# Patient Record
Sex: MALE | Race: WHITE | NOT HISPANIC OR LATINO | Employment: STUDENT | URBAN - METROPOLITAN AREA
[De-identification: names, ages, dates, MRNs, and addresses within clinical notes are randomized per-mention and may not be internally consistent; named-entity substitution may affect disease eponyms.]

---
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
## 2020-02-19 ENCOUNTER — OFFICE VISIT (OUTPATIENT)
Dept: FAMILY MEDICINE CLINIC | Facility: CLINIC | Age: 20
End: 2020-02-19

## 2020-02-19 VITALS
BODY MASS INDEX: 23.43 KG/M2 | HEIGHT: 72 IN | OXYGEN SATURATION: 98 % | SYSTOLIC BLOOD PRESSURE: 122 MMHG | TEMPERATURE: 97.3 F | RESPIRATION RATE: 17 BRPM | DIASTOLIC BLOOD PRESSURE: 74 MMHG | HEART RATE: 66 BPM | WEIGHT: 173 LBS

## 2020-02-19 DIAGNOSIS — S06.0X1D CONCUSSION WITH LOSS OF CONSCIOUSNESS OF 30 MINUTES OR LESS, SUBSEQUENT ENCOUNTER: Primary | ICD-10-CM

## 2020-02-19 PROCEDURE — 99213 OFFICE O/P EST LOW 20 MIN: CPT | Performed by: PHYSICIAN ASSISTANT

## 2020-02-19 PROCEDURE — 1036F TOBACCO NON-USER: CPT | Performed by: PHYSICIAN ASSISTANT

## 2020-02-19 PROCEDURE — 3008F BODY MASS INDEX DOCD: CPT | Performed by: PHYSICIAN ASSISTANT

## 2020-02-19 NOTE — PROGRESS NOTES
Taisha Landrum 21 y o  male   Date:  2/19/2020      Assessment and Plan:    Kat Brownlee was seen today for follow-up and referral     Diagnoses and all orders for this visit:    Concussion with loss of consciousness of 30 minutes or less, subsequent encounter  -    Patient was referred to sports medicine from [facility] for clearance to return to soccer, he already has an appt in 2 days but needs referral  -     Ambulatory referral to Sports Medicine; Future  - uncomplicated, thus far no residual symptoms or neurologic deficits/issues             HPI:  Chief Complaint   Patient presents with    Follow-up     Declines flu vaccine     Referral     Needs referral to specialist for concussion  Concussion was two weeks ago - seen by [place] sports doctor      HPI   Patient is a 20 yo healthy male who presents for referral to see sports medicine  He plays soccer at [employer]  He was playing an off season game to weeks ago and his head collided with another player and he sustained a concussion  His L forehead collided with him  He reports blacking out for a few seconds until he came back to  He has been followed daily by atheletic trainers and had to go see [place] doctor whom noticed nystagmus and he now requires clearance from sports medicine  He already was given a name and has an appt with [address] Sandy Moe in [place], however he needed a referral from our office  He has had headaches at first, last was around 4-5 days ago  He was having sensitivity to light and sound but this has approved  No nausea, vomiting  He is concentrating fine, no balance problems  He is attending class as normal  No balance problems  ROS: Review of Systems   Constitutional: Negative  Eyes: Negative  Respiratory: Negative  Cardiovascular: Negative  Gastrointestinal: Negative  Genitourinary: Negative  Musculoskeletal: Negative  Skin: Negative  Neurological: Positive for headaches (resolved)  Negative for dizziness, seizures, syncope and weakness  Psychiatric/Behavioral: Negative          Past Medical History:   Diagnosis Date    Known health problems: none      Patient Active Problem List   Diagnosis    Chronic seasonal allergic rhinitis    Recurrent sinusitis    Left wrist pain    Acne vulgaris       Past Surgical History:   Procedure Laterality Date    MYRINGOTOMY W/ TUBES      MYRINGOTOMY TUBES IN EARS AS PER [facility]    NO PAST SURGERIES         Social History     Socioeconomic History    Marital status: Single     Spouse name: None    Number of children: None    Years of education: None    Highest education level: None   Occupational History    None   Social Needs    Financial resource strain: None    Food insecurity:     Worry: None     Inability: None    Transportation needs:     Medical: No     Non-medical: No   Tobacco Use    Smoking status: Never Smoker    Smokeless tobacco: Never Used   Substance and Sexual Activity    Alcohol use: No    Drug use: No    Sexual activity: Yes     Partners: Female     Birth control/protection: Condom Male   Lifestyle    Physical activity:     Days per week: 3 days     Minutes per session: None    Stress: None   Relationships    Social connections:     Talks on phone: None     Gets together: None     Attends Restoration service: None     Active member of club or organization: None     Attends meetings of clubs or organizations: None     Relationship status: None    Intimate partner violence:     Fear of current or ex partner: None     Emotionally abused: None     Physically abused: None     Forced sexual activity: None   Other Topics Concern    None   Social History Narrative    Will be going to [employer] for [employer]     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        AS OF 3/27/13 IN [facility]:     PROVIDER: 0    DAYS/WEEK: 0       Family History   Problem Relation Age of Onset    Cancer Maternal Grandfather     No Known Problems Mother     No Known Problems Father        No Known Allergies    No current outpatient medications on file  Physical Exam:  /74 (BP Location: Left arm, Patient Position: Sitting)   Pulse 66   Temp (!) 97 3 °F (36 3 °C) (Tympanic)   Resp 17   Ht 6' (1 829 m)   Wt 78 5 kg (173 lb)   SpO2 98%   BMI 23 46 kg/m²     Physical Exam   Constitutional: He is oriented to person, place, and time  Vital signs are normal  He appears well-developed and well-nourished  No distress  HENT:   Head: Normocephalic and atraumatic  Right Ear: Tympanic membrane, external ear and ear canal normal    Left Ear: Tympanic membrane, external ear and ear canal normal    Nose: Nose normal    Mouth/Throat: Oropharynx is clear and moist    Eyes: Pupils are equal, round, and reactive to light  Conjunctivae, EOM and lids are normal    Neck: Trachea normal and normal range of motion  Neck supple  Cardiovascular: Normal rate, regular rhythm, S1 normal, S2 normal and normal heart sounds  No murmur heard  Pulmonary/Chest: Effort normal and breath sounds normal  No respiratory distress  He has no wheezes  He has no rhonchi  He has no rales  Abdominal: Normal appearance  Musculoskeletal: Normal range of motion  He exhibits no edema or deformity  Neurological: He is alert and oriented to person, place, and time  He has normal strength and normal reflexes  No cranial nerve deficit or sensory deficit  GCS eye subscore is 4  GCS verbal subscore is 5  GCS motor subscore is 6  Skin: Skin is warm and dry  No rash noted  No cyanosis  No pallor  Nails show no clubbing  Psychiatric: He has a normal mood and affect   His behavior is normal  Cognition and memory are normal

## 2020-02-21 VITALS
HEART RATE: 50 BPM | BODY MASS INDEX: 23.94 KG/M2 | SYSTOLIC BLOOD PRESSURE: 119 MMHG | DIASTOLIC BLOOD PRESSURE: 75 MMHG | WEIGHT: 171 LBS | HEIGHT: 71 IN

## 2020-02-21 DIAGNOSIS — S06.0X1A CONCUSSION WITH LOSS OF CONSCIOUSNESS OF 30 MINUTES OR LESS, INITIAL ENCOUNTER: ICD-10-CM

## 2020-02-21 PROCEDURE — 1036F TOBACCO NON-USER: CPT | Performed by: ORTHOPAEDIC SURGERY

## 2020-02-21 PROCEDURE — 99203 OFFICE O/P NEW LOW 30 MIN: CPT | Performed by: ORTHOPAEDIC SURGERY

## 2020-02-21 PROCEDURE — 3008F BODY MASS INDEX DOCD: CPT | Performed by: ORTHOPAEDIC SURGERY

## 2020-02-21 NOTE — PROGRESS NOTES
Assessment:       1  Concussion with loss of consciousness of 30 minutes or less, initial encounter          Plan:        I explained my current clinical findings to DEANDRE  Based on his history he likely sustained a concussion which has now clinically resolved  At this time I will suggest him to start gradual return to play protocol as long as there is no symptom recurrence  He may also return back to all academic activities  Follow-up as needed  Subjective:     Patient ID: Lizzeth Chun is a 21 y o  male  Chief Complaint:    HPI  DEANDRE is a 49-year-old [facility] who is here today for evaluation of a soccer related concussion injury sustained on 2/6/2020 when he left his left forehead with another player's head while playing soccer  Says that he blacked out for a few seconds  Denies seizures, nausea or vomiting  Initial symptoms included feeling dazed and stunned as well as some left-sided frontal headache  All his symptoms subsided about 5 days ago and he currently does not report any symptoms despite doing light aerobic activity or his academic activities  He does not have any known previous history of concussions, learning disability, mood disorders and he is currently not taking any medications  Social History     Occupational History    Not on file   Tobacco Use    Smoking status: Never Smoker    Smokeless tobacco: Never Used   Substance and Sexual Activity    Alcohol use: No    Drug use: No    Sexual activity: Yes     Partners: Female     Birth control/protection: Condom Male      Review of Systems   Constitutional: Negative  HENT: Negative  Eyes: Negative  Respiratory: Negative  Cardiovascular: Negative  Gastrointestinal: Negative  Endocrine: Negative  Genitourinary: Negative  Skin: Negative  Allergic/Immunologic: Negative  Neurological: Negative  Hematological: Negative  Psychiatric/Behavioral: Negative  Objective:     Ortho ExamPhysical Exam   Constitutional: He is oriented to person, place, and time  He appears well-developed and well-nourished  HENT:   Head: Normocephalic and atraumatic  Eyes: Conjunctivae are normal    Cardiovascular: Normal rate and regular rhythm  Pulmonary/Chest: Effort normal  No respiratory distress  Neurological: He is alert and oriented to person, place, and time  Skin: Skin is warm  No erythema  Psychiatric: He has a normal mood and affect  His behavior is normal  Judgment and thought content normal    Nursing note and vitals reviewed  Physical Exam     /75 (BP Location: Left arm, Patient Position: Sitting, Cuff Size: Standard)   Pulse (!) 50   Ht 5' 11" (1 803 m)   Wt 77 6 kg (171 lb)   BMI 23 85 kg/m²   General:   NAD:  Yes  Psych:   AAOX3:  Yes   Mood and Affect:  Normal  HEENT:   Lacerations:  No   Bruising:  No   PEERLA:  Yes   EOMI:  Yes   C/D/I:  Yes   Fracture/Trauma:  No   Fundi Discs Sharp:  N/A  Neuro:   Examination of Coordination:  Normal   CNII - XII Intact:  Yes   FTN:  Normal   Accommodation:  6cm   Convergence:  5cm  Vestibular Ocular:  Gaze stability:  No dizziness or headache with horizontal or vertical saccades    Negative VOR, Smooth ocular pursuit  Mild slow nystagmus on bilateral far laetral gaze

## 2020-02-21 NOTE — LETTER
Academic / Physical School Note &/or Note to Certified Athletic Trainer    February 21, 2020    Patient: Pam Dean  YOB: 2000  Age:  21 y o  Date of visit: 2/21/2020    The above patient was seen in our office recently  Due to a concussion we recommend:    May return back to all academic activities including academic testing  The following instructions that are checked apply for this patient:   No physical activity    Light aerobic, non-contact activity (with no symptoms)    May progress through RTP up to step 4  Please see table below  x Graded concussion Return to Play protocol  Please see table below  1)  No physical activity    2)  Light aerobic activity (walking, swimming, stationary bike)    3)  Sport-specific activity (non-contact)    4)  Non-contact training drill and resistance training    5)  Full contact practice    6)  Normal game     ** If symptoms occur at any level, drop back to prior level  **    Please perform IMPACT test on:  n/a    Patient to return to our office:  As needed    Patient fully understands and verbally agrees with the above mentioned instructions      Please contact our office with any questions at:  [phone]     Sincerely,    MD Pam Johnson

## 2020-04-08 ENCOUNTER — TELEPHONE (OUTPATIENT)
Dept: FAMILY MEDICINE CLINIC | Facility: CLINIC | Age: 20
End: 2020-04-08

## 2021-01-08 ENCOUNTER — NURSE TRIAGE (OUTPATIENT)
Dept: OTHER | Facility: OTHER | Age: 21
End: 2021-01-08

## 2021-01-08 NOTE — TELEPHONE ENCOUNTER
Regarding: COVID test request-asymptomatic, exposure  ----- Message from Anastasiya Fitch sent at 1/8/2021  2:07 PM EST -----  "I was with someone on [place] that just found out they were positive for COVID  I do not have symptoms  "

## 2021-01-08 NOTE — TELEPHONE ENCOUNTER
Reason for Disposition   No answer  First attempt to contact caller  Follow-up call scheduled within 15 minutes  Protocols used: NO CONTACT OR DUPLICATE CONTACT CALL-ADULT-[place]    Called ph# provided but no answer and message states that no voice mailbox has been set up

## 2021-03-05 ENCOUNTER — APPOINTMENT (OUTPATIENT)
Dept: RADIOLOGY | Facility: OTHER | Age: 21
End: 2021-03-05
Payer: COMMERCIAL

## 2021-03-05 ENCOUNTER — OFFICE VISIT (OUTPATIENT)
Dept: OBGYN CLINIC | Facility: OTHER | Age: 21
End: 2021-03-05
Payer: COMMERCIAL

## 2021-03-05 VITALS
WEIGHT: 167 LBS | BODY MASS INDEX: 22.62 KG/M2 | DIASTOLIC BLOOD PRESSURE: 62 MMHG | SYSTOLIC BLOOD PRESSURE: 113 MMHG | HEIGHT: 72 IN | HEART RATE: 76 BPM

## 2021-03-05 DIAGNOSIS — S99.921A INJURY OF RIGHT FOOT, INITIAL ENCOUNTER: ICD-10-CM

## 2021-03-05 DIAGNOSIS — S93.491A SPRAIN OF ANTERIOR TALOFIBULAR LIGAMENT OF RIGHT ANKLE, INITIAL ENCOUNTER: Primary | ICD-10-CM

## 2021-03-05 DIAGNOSIS — M79.671 PAIN IN RIGHT FOOT: ICD-10-CM

## 2021-03-05 PROCEDURE — 99204 OFFICE O/P NEW MOD 45 MIN: CPT | Performed by: FAMILY MEDICINE

## 2021-03-05 PROCEDURE — 73630 X-RAY EXAM OF FOOT: CPT

## 2021-03-05 PROCEDURE — 73610 X-RAY EXAM OF ANKLE: CPT

## 2021-03-05 RX ORDER — NAPROXEN 500 MG/1
500 TABLET ORAL 2 TIMES DAILY PRN
Qty: 40 TABLET | Refills: 0 | Status: SHIPPED | OUTPATIENT
Start: 2021-03-05

## 2021-03-05 NOTE — PATIENT INSTRUCTIONS
Explained the patient that his examination today is consistent with lateral ankle sprain without evidence of fracture on his x-rays  Recommended nonweightbearing with crutches and controlled ankle motion boot  He may begin to walking controlled ankle motion boot as tolerated  He is to follow-up with physician in athletic training room at his college Tuesday next week  Ankle sprains are tears of ligaments in your ankle  Ligaments are fibrous tissues that hold bones together like stitches  Some ligaments such as the ACL in the knee do not heal on their own; however, the ligaments involved in ankle sprain usually do heal without issue over 4-6 weeks  Some people even have relief for more minor sprains within 1-2 weeks  Initial treatment includes PRICE treatment including Protection with ankle splint, Rest with range of motion exercises to prevent stiffness, Ice for 20 minutes every 2-3 hours for the first 2 days or until swelling plateaus, and Elevation to keep the foot and ankle 6-10 inches above your heart when lying down to allow for drainage of fluid from your ankle  Prolonged rest within a few days including immobilization of your ankle in braces, crutches, or Cam boot can result in severe stiffness and worsening of symptoms  As such, please start daily range-of-motion exercises moving your ankle in circles and drawing the alphabet using year big toe as if it were a pencil in the air (GALINA Koehler 2001)  Physical therapy is also key to helping speed up the healing process as well as for preventing future sprain  Once sprained you are at risk for sprain again for up to 1 year after injury  Physical therapy including home exercises for 8-12 weeks has been shown to be preventative of future sprains (B&K 4th)  You may attempt return to running when walking is no longer painful   I recommended gradual return to running to include a few days of 50% walking/50% jogging 1 mile, followed by 50% jogging/50% running 1 mile, followed by 100% running 1 mile if pain free  I recommend increasing mileage at a slow pace thereafter (GALINA Jay 2001)  I also recommend wearing lace-up ankle brace when playing sports for up to 1 year to prevent the rate of recurrence but not severity of recurrent ankle sprains  (Arch Orthop Trauma Surg  2013 Aug; 133 (5): 8038-0559)  If you have persistent symptoms at 6 weeks then we will consider an MRI of your ankle to evaluate for other injuries that can be hidden such as an osteochondral fracture of the talus bone (B&K4th)  Educated risks of mixing NSAIDS ( (non-steroidal anti-inflammatory pills including advil, ibuprofen, motrin, meloxicam, celecoxib, aleve, naproxen, and aspirin containing products) with each other or with steroids (such as prednisone, medrol)  Explained risks of mixing these medications including stomach ulcer, severe internal bleeding, and kidney failure  Instructed not to take NSAIDS if have history of stomach ulcers, kidney issues, or uncontrolled hypertension  Instructed patient to use only one brand as prescribed  For naproxen, a maximum of 500 mg per dose every 12 hours and no more than two doses or 1,000mg per day  For Ibuprofen, a maximum of 800 mg per dose every 6 hours but no more than 3 doses or 2,400 mg per day  Never take these medications together  Never take these medications the same day  For severe pain and only if you have no liver problems, you may add Tylenol (also known as acetaminophen) maximum of 1,000  Mg per dose every 6 hours but no more 3 doses or 3,000 mg per day  Patient expressed understanding and agreed to plan

## 2021-03-05 NOTE — PROGRESS NOTES
1  Sprain of anterior talofibular ligament of right ankle, initial encounter     2  Pain in right foot  XR foot 3+ vw right    XR ankle 3+ vw right   3  Injury of right foot, initial encounter  XR foot 3+ vw right    naproxen (NAPROSYN) 500 mg tablet    XR ankle 3+ vw right     Orders Placed This Encounter   Procedures    XR foot 3+ vw right    XR ankle 3+ vw right        Imaging Studies (I personally reviewed images in PACS and report):    X-ray right foot 03/05/2021: No acute osseous abnormality   X-ray right ankle 03/05/2021: No acute osseous abnormality    IMPRESSION:   right ankle sprain   Right foot contusion      Repeat X-ray next visit:    Right ankle, right foot      Return in about 2 weeks (around 3/19/2021)  Patient Instructions   Explained the patient that his examination today is consistent with lateral ankle sprain without evidence of fracture on his x-rays  Recommended nonweightbearing with crutches and controlled ankle motion boot  He may begin to walking controlled ankle motion boot as tolerated  He is to follow-up with physician in athletic training room at his college Tuesday next week  Ankle sprains are tears of ligaments in your ankle  Ligaments are fibrous tissues that hold bones together like stitches  Some ligaments such as the ACL in the knee do not heal on their own; however, the ligaments involved in ankle sprain usually do heal without issue over 4-6 weeks  Some people even have relief for more minor sprains within 1-2 weeks  Initial treatment includes PRICE treatment including Protection with ankle splint, Rest with range of motion exercises to prevent stiffness, Ice for 20 minutes every 2-3 hours for the first 2 days or until swelling plateaus, and Elevation to keep the foot and ankle 6-10 inches above your heart when lying down to allow for drainage of fluid from your ankle   Prolonged rest within a few days including immobilization of your ankle in braces, crutches, or Cam boot can result in severe stiffness and worsening of symptoms  As such, please start daily range-of-motion exercises moving your ankle in circles and drawing the alphabet using year big toe as if it were a pencil in the air (AFP Kobe Lemming 2001)  Physical therapy is also key to helping speed up the healing process as well as for preventing future sprain  Once sprained you are at risk for sprain again for up to 1 year after injury  Physical therapy including home exercises for 8-12 weeks has been shown to be preventative of future sprains (B&K 4th)  You may attempt return to running when walking is no longer painful  I recommended gradual return to running to include a few days of 50% walking/50% jogging 1 mile, followed by 50% jogging/50% running 1 mile, followed by 100% running 1 mile if pain free  I recommend increasing mileage at a slow pace thereafter (AFP Kobe Lemming 2001)  I also recommend wearing lace-up ankle brace when playing sports for up to 1 year to prevent the rate of recurrence but not severity of recurrent ankle sprains  (Arch Orthop Trauma Surg  2013 Aug; 133 (2): 8123-9942)  If you have persistent symptoms at 6 weeks then we will consider an MRI of your ankle to evaluate for other injuries that can be hidden such as an osteochondral fracture of the talus bone (B&K4th)  Educated risks of mixing NSAIDS ( (non-steroidal anti-inflammatory pills including advil, ibuprofen, motrin, meloxicam, celecoxib, aleve, naproxen, and aspirin containing products) with each other or with steroids (such as prednisone, medrol)  Explained risks of mixing these medications including stomach ulcer, severe internal bleeding, and kidney failure  Instructed not to take NSAIDS if have history of stomach ulcers, kidney issues, or uncontrolled hypertension  Instructed patient to use only one brand as prescribed  For naproxen, a maximum of 500 mg per dose every 12 hours and no more than two doses or 1,000mg per day  For Ibuprofen, a maximum of 800 mg per dose every 6 hours but no more than 3 doses or 2,400 mg per day  Never take these medications together  Never take these medications the same day  For severe pain and only if you have no liver problems, you may add Tylenol (also known as acetaminophen) maximum of 1,000  Mg per dose every 6 hours but no more 3 doses or 3,000 mg per day  Patient expressed understanding and agreed to plan  CHIEF COMPLAINT:  Injury of foot    HPI:  Nando Coles is a 24 y o  male  who presents for       Visit 3/5/2021 :  Evaluation right foot injury that occurred last night when patient rolled his foot when playing soccer  He describes inversion mechanism of injury when getting his cleat stuck another player  He points to lateral as well as medial aspect of his foot as source of pain  He describes as moderate intensity exacerbated by walking  Throbbing pressure sharp at times  He was evaluated by  see recommended evaluation at orthopedic office today  Review of Systems   Constitutional: Negative for chills, fever and unexpected weight change  HENT: Negative for hearing loss, nosebleeds and sore throat  Eyes: Negative for pain, redness and visual disturbance  Respiratory: Negative for cough, shortness of breath and wheezing  Cardiovascular: Negative for chest pain, palpitations and leg swelling  Gastrointestinal: Negative for abdominal distention, nausea and vomiting  Endocrine: Negative for polydipsia and polyuria  Genitourinary: Negative for dysuria and hematuria  Skin: Negative for rash and wound  Neurological: Negative for dizziness, numbness and headaches  Psychiatric/Behavioral: Negative for decreased concentration and suicidal ideas           Following history reviewed and update:    Past Medical History:   Diagnosis Date    Known health problems: none      Past Surgical History:   Procedure Laterality Date    MYRINGOTOMY W/ TUBES      MYRINGOTOMY TUBES IN EARS AS PER NEXTGEN    NO PAST SURGERIES       Social History   Social History     Substance and Sexual Activity   Alcohol Use No     Social History     Substance and Sexual Activity   Drug Use No     Social History     Tobacco Use   Smoking Status Never Smoker   Smokeless Tobacco Never Used     Family History   Problem Relation Age of Onset    Cancer Maternal Grandfather     No Known Problems Mother     No Known Problems Father      No Known Allergies       Physical Exam  /62 (BP Location: Left arm, Patient Position: Sitting, Cuff Size: Adult)   Pulse 76   Ht 6' (1 829 m)   Wt 75 8 kg (167 lb)   BMI 22 65 kg/m²     Constitutional:  see vital signs  Gen: well-developed, normocephalic/atraumatic, well-groomed  Eyes: No inflammation or discharge of conjunctiva or lids; sclera clear   Pharynx: no inflammation, lesion, or mass of lips  Neck: supple, no masses, non-distended  MSK: no inflammation, lesion, mass, or clubbing of nails and digits except for other than mentioned below  SKIN: no visible rashes or skin lesions  Pulmonary/Chest: Effort normal  No respiratory distress     NEURO: cranial nerves grossly intact  PSYCH:  Alert and oriented to person, place, and time; recent and remote memory intact; mood normal, no depression, anxiety, or agitation, judgment and insight good and intact     Ortho Exam  RIGHT ANKLE EXAM  Observation  GAIT:   Antalgic right  Inspection  Erythema: no  Ecchymosis: + lateral ankle  Edema:  + moderate right ankle edema      Tenderness  Proximal Fibula: no  (Maisonneuve frx)  AiTFL: no  (2cm proximal-medial to tip lateral malleolus 92% sens, 29% spec)  ATFL: +  CFL: no  PTFL: no  Achilles:  no  deltoid: No  Peroneal: no  Tibialis Anterior: no  Tibialis Posterior: no    Bony Tenderpoints:  Lateral Malleolus: +  Base of 5th MT: no  Medial Malleolus: no  Navicular: no  Talar dome: No    ROM  Dorsiflexion: intact  Plantarflexion: intact    Muscle Strength  Pronation: intact without pain  Supination: intact without pain    Tib-Fib Squeeze: negative  (fzaxrmwnetbw-ug-fqmzelgfjkvkyp squeeze; 26% sens, 88% spec; rule in test)    Calcaneal Squeeze: negative    Dorsiflexion (+) ER Stress Test: negative  (reproduce ATiFL mech; 71% sens, 63% spec)      Right Calf exam:  No swelling erythema or increased warmth  No palpable cords  Tenderness: none    Right foot exam  No s, erythema or increased warmth  + moderate lateral foot edema  Tenderness: + lateral  midfoot  ROM Toes extension: intact  ROM Toes flexion: intact  Strength Toes: 5/5 flex, ext  Sensation intact  Capillary refill intact      Procedures

## 2021-03-23 ENCOUNTER — APPOINTMENT (OUTPATIENT)
Dept: RADIOLOGY | Facility: OTHER | Age: 21
End: 2021-03-23
Payer: COMMERCIAL

## 2021-03-23 ENCOUNTER — OFFICE VISIT (OUTPATIENT)
Dept: OBGYN CLINIC | Facility: OTHER | Age: 21
End: 2021-03-23
Payer: COMMERCIAL

## 2021-03-23 VITALS
BODY MASS INDEX: 22.65 KG/M2 | HEART RATE: 80 BPM | WEIGHT: 167 LBS | SYSTOLIC BLOOD PRESSURE: 123 MMHG | DIASTOLIC BLOOD PRESSURE: 75 MMHG

## 2021-03-23 DIAGNOSIS — S93.491D SPRAIN OF ANTERIOR TALOFIBULAR LIGAMENT OF RIGHT ANKLE, SUBSEQUENT ENCOUNTER: ICD-10-CM

## 2021-03-23 DIAGNOSIS — S93.491D SPRAIN OF ANTERIOR TALOFIBULAR LIGAMENT OF RIGHT ANKLE, SUBSEQUENT ENCOUNTER: Primary | ICD-10-CM

## 2021-03-23 DIAGNOSIS — S99.921D INJURY OF RIGHT FOOT, SUBSEQUENT ENCOUNTER: ICD-10-CM

## 2021-03-23 PROCEDURE — 73610 X-RAY EXAM OF ANKLE: CPT

## 2021-03-23 PROCEDURE — 73630 X-RAY EXAM OF FOOT: CPT

## 2021-03-23 PROCEDURE — 99214 OFFICE O/P EST MOD 30 MIN: CPT | Performed by: FAMILY MEDICINE

## 2021-03-23 NOTE — PROGRESS NOTES
1  Sprain of anterior talofibular ligament of right ankle, subsequent encounter  XR ankle 3+ vw right    XR foot 3+ vw right   2  Injury of right foot, subsequent encounter  XR ankle 3+ vw right    XR foot 3+ vw right     Orders Placed This Encounter   Procedures    XR ankle 3+ vw right    XR foot 3+ vw right      Imaging Studies (I personally reviewed images in PACS and report):  XR R ankle 3/23/21 - No acute osseous abnormality  XR R foot 3/23/21 - No acute osseous abnormality    Past Diagnostics:   X-ray right foot 03/05/2021: No acute osseous abnormality   X-ray right ankle 03/05/2021: No acute osseous abnormality    IMPRESSION:  Lateral ankle sprain -  improving   DOI: 3/4/21   FUI:  2 weeks 5 days    Repeat X-ray next visit: none  Will continue working with AT[employer] and physician at training room  Patient Instructions     Patient is steadily improving from lateral ankle sprain that occurred 2 weeks and 5 days ago  Patient able to complete functional assessment today, and can transition to soccer with the assistance of the athletic trainers at his college  He was provided today with a lace-up ankle brace, and should wear that with all sports activities to decrease risk of recurrence ankle injury  He can follow-up in this office as needed, but should follow-up with his athletic training room next week  CHIEF COMPLAINT:  Ankle pain f/u    HPI:  Trisha Rogers is a 24 y o  male  who presents for  Follow-up evaluation of his right foot injury that took place on 3/4/21, while playing soccer and he inverted his ankle  Visit 3/24/2021 :    He states he has been steadily improving, and has been out of his Cam boot for several days without significant pain  No new injuries  No numbness, tingling, weakness  Occasional pain with extended plantar flexion  He states he is able to kick a soccer ball from his medial foot, but has not yet been able to do it on the dorsal surface due to pain    Today he presents wearing regular sneakers, and has normal gait  He is not taking anything for pain  Review of Systems   Constitutional: Negative for chills and fever  Neurological: Negative for weakness and numbness  Following history reviewed and update:    Past Medical History:   Diagnosis Date    Known health problems: none      Past Surgical History:   Procedure Laterality Date    MYRINGOTOMY W/ TUBES      MYRINGOTOMY TUBES IN EARS AS PER NEXTGEN    NO PAST SURGERIES       Social History   Social History     Substance and Sexual Activity   Alcohol Use No     Social History     Substance and Sexual Activity   Drug Use No     Social History     Tobacco Use   Smoking Status Never Smoker   Smokeless Tobacco Never Used     Family History   Problem Relation Age of Onset    Cancer Maternal Grandfather     No Known Problems Mother     No Known Problems Father      No Known Allergies       Physical Exam  /75 (BP Location: Left arm, Patient Position: Sitting, Cuff Size: Adult)   Pulse 80   Wt 75 8 kg (167 lb)   BMI 22 65 kg/m²     Constitutional:  see vital signs  Gen: well-developed, normocephalic/atraumatic, well-groomed  Eyes: No inflammation or discharge of conjunctiva or lids; sclera clear   Pharynx: no inflammation, lesion, or mass of lips  Neck: supple, no masses, non-distended  MSK: no inflammation, lesion, mass, or clubbing of nails and digits except for other than mentioned below  SKIN: no visible rashes or skin lesions  Pulmonary/Chest: Effort normal  No respiratory distress     NEURO: cranial nerves grossly intact  PSYCH:  Alert and oriented to person, place, and time; recent and remote memory intact; mood normal, no depression, anxiety, or agitation, judgment and insight good and intact     Ortho Exam   RIGHT ANKLE EXAM  Observation  GAIT:  normal   Running: normal at fast pace    Inspection  Erythema: no  Ecchymosis: no  Edema:  none    Tenderness  Proximal Fibula: no  (Maisonneuve frx)  AiTFL: no  (2cm proximal-medial to tip lateral malleolus 92% sens, 29% spec)  ATFL: +  CFL: no  PTFL: no  Achilles:  no  Deltoid: No  Peroneal: no  Tibialis Anterior: no  Tibialis Posterior: no    Bony Tenderpoints:  Lateral Malleolus: no  Base of 5th MT: no  Medial Malleolus: no  Navicular: no  Talar dome: No    ROM  Dorsiflexion: intact  Plantarflexion: intact    Muscle Strength  Pronation: intact with minimal pain  Supination: intact without pain    Tib-Fib Squeeze: negative  (jxlazsvvhkgn-ix-tvdloetofmthck squeeze; 26% sens, 88% spec; rule in test)    Calcaneal Squeeze: negative    Dorsiflexion (+) ER Stress Test: negative  (reproduce ATiFL mech; 71% sens, 63% spec)       Single leg heel rises :  normal          Procedures

## 2021-04-23 ENCOUNTER — APPOINTMENT (OUTPATIENT)
Dept: RADIOLOGY | Facility: OTHER | Age: 21
End: 2021-04-23
Payer: COMMERCIAL

## 2021-04-23 ENCOUNTER — TELEPHONE (OUTPATIENT)
Dept: OBGYN CLINIC | Facility: HOSPITAL | Age: 21
End: 2021-04-23

## 2021-04-23 ENCOUNTER — OFFICE VISIT (OUTPATIENT)
Dept: OBGYN CLINIC | Facility: OTHER | Age: 21
End: 2021-04-23
Payer: COMMERCIAL

## 2021-04-23 VITALS
SYSTOLIC BLOOD PRESSURE: 128 MMHG | HEART RATE: 61 BPM | BODY MASS INDEX: 23.38 KG/M2 | WEIGHT: 167 LBS | DIASTOLIC BLOOD PRESSURE: 68 MMHG | HEIGHT: 71 IN

## 2021-04-23 DIAGNOSIS — S99.921A INJURY OF RIGHT FOOT, INITIAL ENCOUNTER: ICD-10-CM

## 2021-04-23 DIAGNOSIS — S92.101A: Primary | ICD-10-CM

## 2021-04-23 PROCEDURE — 73630 X-RAY EXAM OF FOOT: CPT

## 2021-04-23 PROCEDURE — 73610 X-RAY EXAM OF ANKLE: CPT

## 2021-04-23 PROCEDURE — 99214 OFFICE O/P EST MOD 30 MIN: CPT | Performed by: FAMILY MEDICINE

## 2021-04-23 RX ORDER — CALCIUM CARBONATE 160(400)MG
TABLET,CHEWABLE ORAL DAILY
Qty: 1 EACH | Refills: 0 | Status: SHIPPED | OUTPATIENT
Start: 2021-04-23

## 2021-04-23 RX ORDER — ACETAMINOPHEN 500 MG
500 TABLET ORAL EVERY 6 HOURS PRN
COMMUNITY

## 2021-04-23 NOTE — LETTER
April 23, 2021     Patient: Fransisco Del Real   YOB: 2000   Date of Visit: 4/23/2021       To Whom it May Concern:    Fransisco Del Real is under my professional care  He was seen in my office on 4/23/2021  He should not return to gym class or sports until cleared by a physician  If you have any questions or concerns, please don't hesitate to call           Sincerely,          Meagan Weeks III, DO        CC: Fransiscoangelica Del Real

## 2021-04-23 NOTE — PROGRESS NOTES
1  Traumatic closed nondisplaced fracture of right talus, initial encounter  Misc  Devices (Rollator Ultra-Light) MISC   2  Injury of right foot, initial encounter  XR foot 3+ vw right    CANCELED: XR ankle 2 vw right     Orders Placed This Encounter   Procedures    XR foot 3+ vw right        Imaging Studies (I personally reviewed images in PACS and report):   x-ray right ankle 04/23/2021:   Nondisplaced posterior talus fracture in addition to os trigonum    IMPRESSION:   nondisplaced posterior talus fracture   Date of Injury:  04/22/2021   Follow-up from injury: 1 day      Repeat X-ray next visit:    Right ankle, right foot      Return in about 1 week (around 4/30/2021)  Patient Instructions   Splint posterior ankle right applied today  Maintain non-weight-bearing with crutches    I explained to patient risk of non-operative treatment for fracture including but not limited to slippage or movement of fracture and healing of fracture in wrong location that could result in need for surgery or development of arthritis and limited range of motion after healing is resolved  Patient expressed understanding and agreed with plan to pursue non-operative treatment for fracture  reviewed splint precautions including instruction not to wet splint  instructed if any swelling, pain, numbness, tingling then to contact office immediately for instruction or go to ER if physician unavailable  reviewed risks of splint including joint stiffness, skin breakdown, ulceration, risk of infection if wedging objects behind cast  Patient expressed understanding and agreed to plan  CHIEF COMPLAINT:   right ankle injury    HPI:  Flor Monae is a 24 y o  male  who presents for       Visit 4/23/2021 :   evaluation of right ankle injury that occurred yesterday  Patient recently seen for ankle sprain return to play with no issue  States that all of his pain was resolved    Rolled his ankle again yesterday and points to medial aspect of his ankle as source of greatest pain  Intermittent exacerbated by walking moderate intensity soreness and throbbing with associated symptoms including swelling  Review of Systems   Constitutional: Negative for chills and fever  Neurological: Negative for weakness  Following history reviewed and update:    Past Medical History:   Diagnosis Date    Known health problems: none      Past Surgical History:   Procedure Laterality Date    MYRINGOTOMY W/ TUBES      MYRINGOTOMY TUBES IN EARS AS PER NEXTGEN    NO PAST SURGERIES       Social History   Social History     Substance and Sexual Activity   Alcohol Use No     Social History     Substance and Sexual Activity   Drug Use No     Social History     Tobacco Use   Smoking Status Never Smoker   Smokeless Tobacco Never Used     Family History   Problem Relation Age of Onset    Cancer Maternal Grandfather     No Known Problems Mother     No Known Problems Father      No Known Allergies       Physical Exam  /68 (BP Location: Left arm, Patient Position: Sitting, Cuff Size: Standard)   Pulse 61   Ht 5' 11" (1 803 m)   Wt 75 8 kg (167 lb)   BMI 23 29 kg/m²     Constitutional:  see vital signs  Gen: well-developed, normocephalic/atraumatic, well-groomed  Eyes: No inflammation or discharge of conjunctiva or lids; sclera clear   Pharynx: no inflammation, lesion, or mass of lips  Neck: supple, no masses, non-distended  MSK: no inflammation, lesion, mass, or clubbing of nails and digits except for other than mentioned below  SKIN: no visible rashes or skin lesions  Pulmonary/Chest: Effort normal  No respiratory distress     NEURO: cranial nerves grossly intact  PSYCH:  Alert and oriented to person, place, and time; recent and remote memory intact; mood normal, no depression, anxiety, or agitation, judgment and insight good and intact     Ortho Exam  RIGHT ANKLE EXAM  Observation  GAIT:    Nonweightbearing    Inspection  Erythema: no  Ecchymosis: + medial  Edema:    Moderate ankle edema worst medial      Tenderness  Proximal Fibula: no  (Maisonneuve frx)  AiTFL: no  (2cm proximal-medial to tip lateral malleolus 92% sens, 29% spec)  ATFL: +  CFL: no  PTFL: no  Achilles:  no  deltoid: ++  Peroneal: no  Tibialis Anterior: no  Tibialis Posterior: no    Bony Tenderpoints:  Lateral Malleolus: no  Base of 5th MT: no  Medial Malleolus: ++  Navicular: no  Talar dome: No      ROM  Dorsiflexion: intact  Plantarflexion: intact    Muscle Strength  Pronation: intact without pain  Supination: intact without pain    Tib-Fib Squeeze: negative  (tkgonbyvzccs-ke-stvokbypktsyat squeeze; 26% sens, 88% spec; rule in test)    Calcaneal Squeeze: negative    Dorsiflexion (+) ER Stress Test: negative  (reproduce ATiFL mech; 71% sens, 63% spec)      RIGHT ACHILLES EXAMINATION:  Simmonds Triad:  Palpable Gap or Defect of Achilles: none  Angle of Declination: angle of baseline plantarflexion symmetric to contralateral side  Matles Test (patient prone, intact and symmetric plantarflexion of ankle when flexing knee): intact  Fermin's Calf Squeeze Test: intact obligatory plantarflexion        Procedures

## 2021-04-23 NOTE — PATIENT INSTRUCTIONS
Splint posterior ankle right applied today  Maintain non-weight-bearing with crutches    I explained to patient risk of non-operative treatment for fracture including but not limited to slippage or movement of fracture and healing of fracture in wrong location that could result in need for surgery or development of arthritis and limited range of motion after healing is resolved  Patient expressed understanding and agreed with plan to pursue non-operative treatment for fracture  reviewed splint precautions including instruction not to wet splint  instructed if any swelling, pain, numbness, tingling then to contact office immediately for instruction or go to ER if physician unavailable  reviewed risks of splint including joint stiffness, skin breakdown, ulceration, risk of infection if wedging objects behind cast  Patient expressed understanding and agreed to plan

## 2021-04-23 NOTE — TELEPHONE ENCOUNTER
[facility] is calling in reference to an order they received for an ultra light rollator for this patient  They do not supply DME and request that this order be forwarded to an appropriate DME supplier  Please advise

## 2021-04-30 ENCOUNTER — APPOINTMENT (OUTPATIENT)
Dept: RADIOLOGY | Facility: OTHER | Age: 21
End: 2021-04-30
Payer: COMMERCIAL

## 2021-04-30 ENCOUNTER — OFFICE VISIT (OUTPATIENT)
Dept: OBGYN CLINIC | Facility: OTHER | Age: 21
End: 2021-04-30
Payer: COMMERCIAL

## 2021-04-30 VITALS
WEIGHT: 167.11 LBS | BODY MASS INDEX: 23.4 KG/M2 | HEART RATE: 61 BPM | HEIGHT: 71 IN | DIASTOLIC BLOOD PRESSURE: 66 MMHG | SYSTOLIC BLOOD PRESSURE: 119 MMHG

## 2021-04-30 DIAGNOSIS — S92.101D: ICD-10-CM

## 2021-04-30 DIAGNOSIS — S92.101D: Primary | ICD-10-CM

## 2021-04-30 PROCEDURE — 99213 OFFICE O/P EST LOW 20 MIN: CPT | Performed by: FAMILY MEDICINE

## 2021-04-30 PROCEDURE — 73610 X-RAY EXAM OF ANKLE: CPT

## 2021-04-30 PROCEDURE — 73630 X-RAY EXAM OF FOOT: CPT

## 2021-04-30 NOTE — PROGRESS NOTES
1  Traumatic closed nondisplaced fracture of right talus, with routine healing, subsequent encounter  XR ankle 3+ vw right    XR foot 3+ vw right     Orders Placed This Encounter   Procedures    XR ankle 3+ vw right    XR foot 3+ vw right        Imaging Studies (I personally reviewed images in PACS and report):  X-ray right ankle 4/30/21:  Stable alignment of minimally displaced posterior talus fracture        IMPRESSION:  nondisplaced posterior talus fracture   Date of Injury:  04/22/2021   Follow-up from injury: 8 days      Repeat X-ray next visit:    Right ankle x-ray      Return in about 1 week (around 5/7/2021)  Patient Instructions    Transition to short-leg cast today   Maintain nonweightbearing with crutches Or knee scooter   Recommend repeating x-rays in approximately 1 week to evaluate for maintenance of alignment  If at that time minutes alignment is confirm we will follow-up with patient at a later date    I explained to patient risk of non-operative treatment for fracture including but not limited to slippage or movement of fracture and healing of fracture in wrong location that could result in need for surgery or development of arthritis and limited range of motion after healing is resolved  Patient expressed understanding and agreed with plan to pursue non-operative treatment for fracture  reviewed cast precautions including instruction not to wet cast  instructed if any swelling, pain, numbness, tingling then to contact office immediately for instruction or go to ER if physician unavailable  reviewed risks of cast including joint stiffness, skin breakdown, ulceration, risk of infection if wedging objects behind cast  Patient expressed understanding and agreed to plan  CHIEF COMPLAINT:   follow-up right ankle fracture    HPI:  Jeovanny Mckoy is a 24 y o  male  who presents for       Visit 4/30/2021 :   follow-up right ankle fracture:  Stable  Minimal pain in splint   Did have exacerbation of pain approximately 2 days ago  States today that it feels more swollen than it was before splint was placed  Review of Systems   Constitutional: Negative for chills and fever  Neurological: Negative for weakness  Following history reviewed and update:    Past Medical History:   Diagnosis Date    Known health problems: none      Past Surgical History:   Procedure Laterality Date    MYRINGOTOMY W/ TUBES      MYRINGOTOMY TUBES IN EARS AS PER NEXTGEN    NO PAST SURGERIES       Social History   Social History     Substance and Sexual Activity   Alcohol Use No     Social History     Substance and Sexual Activity   Drug Use No     Social History     Tobacco Use   Smoking Status Never Smoker   Smokeless Tobacco Never Used     Family History   Problem Relation Age of Onset    Cancer Maternal Grandfather     No Known Problems Mother     No Known Problems Father      No Known Allergies       Physical Exam  /66 (BP Location: Right arm, Patient Position: Sitting, Cuff Size: Standard)   Pulse 61   Ht 5' 11" (1 803 m)   Wt 75 8 kg (167 lb 1 7 oz)   BMI 23 31 kg/m²     Constitutional:  see vital signs  Gen: well-developed, normocephalic/atraumatic, well-groomed  Eyes: No inflammation or discharge of conjunctiva or lids; sclera clear   Pharynx: no inflammation, lesion, or mass of lips  Neck: supple, no masses, non-distended  MSK: no inflammation, lesion, mass, or clubbing of nails and digits except for other than mentioned below  SKIN: no visible rashes or skin lesions  Pulmonary/Chest: Effort normal  No respiratory distress  NEURO: cranial nerves grossly intact  PSYCH:  Alert and oriented to person, place, and time; recent and remote memory intact; mood normal, no depression, anxiety, or agitation, judgment and insight good and intact     Ortho Exam    RIGHT ANKLE EXAM  Observation  GAIT:   Nonweightbearing    Inspection  Erythema: no  Ecchymosis: no  Edema:     Moderate Medial ankle edema      Tenderness  Proximal Fibula: no  (Maisonneuve frx)  AiTFL: no  (2cm proximal-medial to tip lateral malleolus 92% sens, 29% spec)  ATFL: no  CFL: no  PTFL: no  Achilles:  no  deltoid:  none  Peroneal: no  Tibialis Anterior: no  Tibialis Posterior: no    Bony Tenderpoints:  Lateral Malleolus: no  Base of 5th MT: no  Medial Malleolus: no  Navicular: no  Talar dome: No   posterior medial ankle    ROM  Dorsiflexion: intact  Plantarflexion: intact    Muscle Strength  Pronation: intact without pain  Supination: intact without pain    Tib-Fib Squeeze: negative  ([id number] squeeze; 26% sens, 88% spec; rule in test)    Calcaneal Squeeze: negative    Dorsiflexion (+) ER Stress Test: negative  (reproduce ATiFL mech; 71% sens, 63% spec)      Procedures

## 2021-04-30 NOTE — PATIENT INSTRUCTIONS
Transition to short-leg cast today   Maintain nonweightbearing with crutches Or knee scooter   Recommend repeating x-rays in approximately 1 week to evaluate for maintenance of alignment  If at that time minutes alignment is confirm we will follow-up with patient at a later date    I explained to patient risk of non-operative treatment for fracture including but not limited to slippage or movement of fracture and healing of fracture in wrong location that could result in need for surgery or development of arthritis and limited range of motion after healing is resolved  Patient expressed understanding and agreed with plan to pursue non-operative treatment for fracture  reviewed cast precautions including instruction not to wet cast  instructed if any swelling, pain, numbness, tingling then to contact office immediately for instruction or go to ER if physician unavailable  reviewed risks of cast including joint stiffness, skin breakdown, ulceration, risk of infection if wedging objects behind cast  Patient expressed understanding and agreed to plan

## 2021-05-07 ENCOUNTER — TELEPHONE (OUTPATIENT)
Dept: OBGYN CLINIC | Facility: OTHER | Age: 21
End: 2021-05-07

## 2021-05-07 ENCOUNTER — APPOINTMENT (OUTPATIENT)
Dept: RADIOLOGY | Facility: OTHER | Age: 21
End: 2021-05-07
Payer: COMMERCIAL

## 2021-05-07 ENCOUNTER — OFFICE VISIT (OUTPATIENT)
Dept: OBGYN CLINIC | Facility: OTHER | Age: 21
End: 2021-05-07
Payer: COMMERCIAL

## 2021-05-07 VITALS
DIASTOLIC BLOOD PRESSURE: 74 MMHG | BODY MASS INDEX: 23.29 KG/M2 | WEIGHT: 167 LBS | SYSTOLIC BLOOD PRESSURE: 115 MMHG | HEART RATE: 63 BPM

## 2021-05-07 DIAGNOSIS — S92.101D: ICD-10-CM

## 2021-05-07 DIAGNOSIS — S92.101D: Primary | ICD-10-CM

## 2021-05-07 PROCEDURE — 73610 X-RAY EXAM OF ANKLE: CPT

## 2021-05-07 PROCEDURE — 99213 OFFICE O/P EST LOW 20 MIN: CPT | Performed by: FAMILY MEDICINE

## 2021-05-07 NOTE — PATIENT INSTRUCTIONS
Transition to cast today  Maintain non weight bearing with crutches  RX given for knee rollator scooter    I explained to patient risk of non-operative treatment for fracture including but not limited to slippage or movement of fracture and healing of fracture in wrong location that could result in need for surgery or development of arthritis and limited range of motion after healing is resolved  Patient expressed understanding and agreed with plan to pursue non-operative treatment for fracture  reviewed cast precautions including instruction not to wet cast  instructed if any swelling, pain, numbness, tingling then to contact office immediately for instruction or go to ER if physician unavailable  reviewed risks of cast including joint stiffness, skin breakdown, ulceration, risk of infection if wedging objects behind cast  Patient expressed understanding and agreed to plan

## 2021-05-07 NOTE — PROGRESS NOTES
1  Traumatic closed nondisplaced fracture of right talus, with routine healing, subsequent encounter  XR ankle 3+ vw right     Orders Placed This Encounter   Procedures    XR ankle 3+ vw right        Imaging Studies (I personally reviewed images in PACS and report):   x-ray right ankle 05/07/2021:   Stable alignment posterior talus fracture    IMPRESSION:  nondisplaced posterior talus fracture   Date of Injury:  04/22/2021   Follow-up from injury: 2 weeks 1 day      Repeat X-ray next visit:    Right ankle      Return in about 4 weeks (around 6/4/2021)  Patient Instructions    Transition to cast today  Maintain non weight bearing with crutches  RX given for knee rollator scooter    I explained to patient risk of non-operative treatment for fracture including but not limited to slippage or movement of fracture and healing of fracture in wrong location that could result in need for surgery or development of arthritis and limited range of motion after healing is resolved  Patient expressed understanding and agreed with plan to pursue non-operative treatment for fracture  reviewed cast precautions including instruction not to wet cast  instructed if any swelling, pain, numbness, tingling then to contact office immediately for instruction or go to ER if physician unavailable  reviewed risks of cast including joint stiffness, skin breakdown, ulceration, risk of infection if wedging objects behind cast  Patient expressed understanding and agreed to plan  CHIEF COMPLAINT:   right ankle fracture    HPI:  Rafiq Coburn is a 24 y o  male  who presents for       Visit 5/7/2021 :   follow-up right ankle fracture:  Stable  Swelling improved since last visit  Short-leg splint was maintained last visit due to swelling  Denies any worsening of pain  Compliant with crutches  Unable to obtain knee roller scooter  Review of Systems   Constitutional: Negative for chills and fever     Neurological: Negative for weakness  Following history reviewed and update:    Past Medical History:   Diagnosis Date    Known health problems: none      Past Surgical History:   Procedure Laterality Date    MYRINGOTOMY W/ TUBES      MYRINGOTOMY TUBES IN EARS AS PER NEXTGEN    NO PAST SURGERIES       Social History   Social History     Substance and Sexual Activity   Alcohol Use No     Social History     Substance and Sexual Activity   Drug Use No     Social History     Tobacco Use   Smoking Status Never Smoker   Smokeless Tobacco Never Used     Family History   Problem Relation Age of Onset    Cancer Maternal Grandfather     No Known Problems Mother     No Known Problems Father      No Known Allergies       Physical Exam  /74 (BP Location: Left arm, Patient Position: Sitting, Cuff Size: Adult)   Pulse 63   Wt 75 8 kg (167 lb)   BMI 23 29 kg/m²     Constitutional:  see vital signs  Gen: well-developed, normocephalic/atraumatic, well-groomed  Eyes: No inflammation or discharge of conjunctiva or lids; sclera clear   Pharynx: no inflammation, lesion, or mass of lips  Neck: supple, no masses, non-distended  MSK: no inflammation, lesion, mass, or clubbing of nails and digits except for other than mentioned below  SKIN: no visible rashes or skin lesions  Pulmonary/Chest: Effort normal  No respiratory distress     NEURO: cranial nerves grossly intact  PSYCH:  Alert and oriented to person, place, and time; recent and remote memory intact; mood normal, no depression, anxiety, or agitation, judgment and insight good and intact     Ortho Exam  RIGHT ANKLE EXAM  Observation  GAIT:    Nonweightbearing    Inspection  Erythema: no  Ecchymosis: no  Edema:   Mild-to-moderate diffuse      Tenderness  Proximal Fibula: no  (Maisonneuve frx)  AiTFL: no  (2cm proximal-medial to tip lateral malleolus 92% sens, 29% spec)  ATFL: no  CFL: no  PTFL: no  Achilles:  no  deltoid: No  Peroneal: no  Tibialis Anterior: no  Tibialis Posterior: no    Bony Tenderpoints:  Lateral Malleolus: no  Base of 5th MT: no  Medial Malleolus: no  Navicular: no  Talar dome: No  + posterior medial    ROM  Dorsiflexion: intact  Plantarflexion: intact    Muscle Strength  Pronation: intact without pain  Supination: intact without pain      Calcaneal Squeeze: negative    LEFT ACHILLES EXAMINATION:  Simmonds Triad:  Palpable Gap or Defect of Achilles: none  Angle of Declination: angle of baseline plantarflexion symmetric to contralateral side  Matles Test (patient prone, intact and symmetric plantarflexion of ankle when flexing knee): intact  Fermin's Calf Squeeze Test: intact obligatory plantarflexion    Procedures

## 2021-05-11 ENCOUNTER — TELEPHONE (OUTPATIENT)
Dept: OBGYN CLINIC | Facility: HOSPITAL | Age: 21
End: 2021-05-11

## 2021-05-11 DIAGNOSIS — S92.101D: Primary | ICD-10-CM

## 2021-06-09 NOTE — PROGRESS NOTES
1  Traumatic closed nondisplaced fracture of right talus, with routine healing, subsequent encounter  XR ankle 3+ vw right    Ambulatory referral to Physical Therapy   2  Ankle stiffness, right  Ambulatory referral to Physical Therapy     Orders Placed This Encounter   Procedures    XR ankle 3+ vw right    Ambulatory referral to Physical Therapy        Imaging Studies (I personally reviewed images in PACS and report):   x-ray right ankle 06/10/2021: Interval healing with almost complete obscuration of fracture line the posterior talus    Past diagnostics:   x-ray right ankle 05/07/2021:   Stable alignment posterior talus fracture     IMPRESSION:  nondisplaced posterior talus fracture   Date of Injury:  04/22/2021   Follow-up from injury: 6 wks and 6 days      Repeat X-ray next visit:    Right ankle      Return in about 4 weeks (around 7/8/2021)  Patient Instructions    Cease crutches cast and transition to weight-bearing as tolerated with lace-up ankle brace  No running, jumping, or other high risk activities  Start ROM exercises immediately  Start PT immediately with gentle strengthening  Follow-up 4 weeks          CHIEF COMPLAINT:  Follow-up right ankle injury     HPI:  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 acute traumatic right ankle pain secondary to injury sustained on 4/22 (6 wks and 6 days ago)  Was subsequently seen and diagnosed with posterior talus fracture  At last office visit 05/07/2021 he was transitioned to to short leg cast  He was instructed to maintain NWB via crutches  Visit 6/10/2021 :   today, patient states he has no pain  Has been compliant with nonweightbearing  He does have stiffness of the right ankle          Review of Systems   Constitutional: Negative for chills and fever  Neurological: Negative for weakness           Following history reviewed and update:    Past Medical History:   Diagnosis Date    Known health problems: none      Past Surgical History:   Procedure Laterality Date    MYRINGOTOMY W/ TUBES      MYRINGOTOMY TUBES IN EARS AS PER NEXTGEN    NO PAST SURGERIES       Social History   Social History     Substance and Sexual Activity   Alcohol Use No     Social History     Substance and Sexual Activity   Drug Use No     Social History     Tobacco Use   Smoking Status Never Smoker   Smokeless Tobacco Never Used     Family History   Problem Relation Age of Onset    Cancer Maternal Grandfather     No Known Problems Mother     No Known Problems Father      No Known Allergies       Physical Exam  Wt 76 2 kg (168 lb)   BMI 23 43 kg/m²     Constitutional:  see vital signs  Gen: well-developed, normocephalic/atraumatic, well-groomed  Eyes: No inflammation or discharge of conjunctiva or lids; sclera clear   Pharynx: no inflammation, lesion, or mass of lips  Neck: supple, no masses, non-distended  MSK: no inflammation, lesion, mass, or clubbing of nails and digits except for other than mentioned below  SKIN: no visible rashes or skin lesions  Pulmonary/Chest: Effort normal  No respiratory distress     NEURO: cranial nerves grossly intact  PSYCH:  Alert and oriented to person, place, and time; recent and remote memory intact; mood normal, no depression, anxiety, or agitation, judgment and insight good and intact     Ortho Exam    RIGHT ANKLE EXAM  Observation  GAIT:   nonweightbearing    Inspection  Erythema: no  Ecchymosis: no  Edema:  none      Tenderness  Proximal Fibula: no  (Maisonneuve frx)  AiTFL: no  (2cm proximal-medial to tip lateral malleolus 92% sens, 29% spec)  ATFL: no  CFL: no  PTFL: no  Achilles:  no  deltoid: No  Peroneal: no  Tibialis Anterior: no  Tibialis Posterior: no    Bony Tenderpoints:  Lateral Malleolus: no  Base of 5th MT: no  Medial Malleolus: no  Navicular: no  Talar dome: No    ROM  Dorsiflexion: intact  Plantarflexion: intact    Muscle Strength  Pronation: intact without pain  Supination: intact without pain    Procedures

## 2021-06-10 ENCOUNTER — OFFICE VISIT (OUTPATIENT)
Dept: OBGYN CLINIC | Facility: OTHER | Age: 21
End: 2021-06-10
Payer: COMMERCIAL

## 2021-06-10 ENCOUNTER — APPOINTMENT (OUTPATIENT)
Dept: RADIOLOGY | Facility: OTHER | Age: 21
End: 2021-06-10
Payer: COMMERCIAL

## 2021-06-10 VITALS — BODY MASS INDEX: 23.43 KG/M2 | WEIGHT: 168 LBS

## 2021-06-10 DIAGNOSIS — S92.101D: Primary | ICD-10-CM

## 2021-06-10 DIAGNOSIS — S92.101D: ICD-10-CM

## 2021-06-10 DIAGNOSIS — M25.671 ANKLE STIFFNESS, RIGHT: ICD-10-CM

## 2021-06-10 PROCEDURE — 99213 OFFICE O/P EST LOW 20 MIN: CPT | Performed by: FAMILY MEDICINE

## 2021-06-10 PROCEDURE — 1036F TOBACCO NON-USER: CPT | Performed by: FAMILY MEDICINE

## 2021-06-10 PROCEDURE — 73610 X-RAY EXAM OF ANKLE: CPT

## 2021-06-10 NOTE — PATIENT INSTRUCTIONS
Cease crutches cast and transition to weight-bearing as tolerated with lace-up ankle brace  No running, jumping, or other high risk activities  Start ROM exercises immediately  Start PT immediately with gentle strengthening  Follow-up 4 weeks

## 2021-07-08 ENCOUNTER — APPOINTMENT (OUTPATIENT)
Dept: RADIOLOGY | Facility: OTHER | Age: 21
End: 2021-07-08
Payer: COMMERCIAL

## 2021-07-08 ENCOUNTER — OFFICE VISIT (OUTPATIENT)
Dept: OBGYN CLINIC | Facility: OTHER | Age: 21
End: 2021-07-08
Payer: COMMERCIAL

## 2021-07-08 VITALS
WEIGHT: 176 LBS | DIASTOLIC BLOOD PRESSURE: 75 MMHG | BODY MASS INDEX: 24.55 KG/M2 | HEART RATE: 97 BPM | SYSTOLIC BLOOD PRESSURE: 117 MMHG

## 2021-07-08 DIAGNOSIS — S92.101D: Primary | ICD-10-CM

## 2021-07-08 DIAGNOSIS — S92.101D: ICD-10-CM

## 2021-07-08 PROCEDURE — 73610 X-RAY EXAM OF ANKLE: CPT

## 2021-07-08 PROCEDURE — 99213 OFFICE O/P EST LOW 20 MIN: CPT | Performed by: FAMILY MEDICINE

## 2021-07-08 NOTE — PATIENT INSTRUCTIONS
Start physical therapy with gradual return to running  Over the next 6 weeks   patient to follow-up with athletic trainers at [facility]  May follow-up with physician as needed    Ankle Exercises   AMBULATORY CARE:   What you need to know about ankle exercises: Ankle exercises help strengthen your ankle and improve its function after injury  These are beginning exercises  Ask your healthcare provider if you need to see a physical therapist for more advanced exercises  · Do these exercises 3 to 5 days a week , or as directed by your healthcare provider  Ask if you should perform the exercises on each ankle  · Do the exercises in the order that your healthcare provider recommends  This will help prevent swelling, chronic pain, and reinjury  Start with range of motion exercises  Then progress to strengthening exercises, and finally to balancing exercises  · Warm up before you do ankle exercises  Walk or ride a stationary bike for 5 to 10 minutes to prepare your ankle for movement  · Stop if you feel pain  It is normal to feel some discomfort at first  Regular exercise will help decrease your discomfort over time  How to perform range of motion exercises safely:  Begin with range of motion exercises to improve flexibility  Ask your healthcare provider when you can progress to strengthening exercises  · Ankle alphabet:  Sit on a chair so that your feet do not touch the floor  Use your big toe to write each letter of the alphabet  Use only your foot and ankle, and keep your movements small  Do 2 sets  · Calf stretches:      ? Sitting calf stretches with a towel:  Sit on the floor with both legs out straight in front of you  Loop a towel around the ball of your injured foot  Grasp the ends of the towel and pull it toward you  Keep your leg and back straight  Do not lean forward as you pull the towel  Hold for 30 seconds  Then relax for 30 seconds   Do 2 sets of 10          ? Standing calf stretches:  Stand facing a wall with the foot that is not injured forward and your knee slightly bent  Keep the leg with the injured foot straight and behind you with your toes pointed in slightly  With both heels flat on the floor, press your hips forward  Do not arch your back  Hold for 30 seconds, and then relax for 30 seconds  Do 2 sets of 10  Repeat with your leg bent  Do 2 sets of 10  How to perform strengthening exercises safely:  After you can perform range of motion exercises without pain, you may begin strengthening exercises  Ask your healthcare provider when you can progress to balancing exercises  · Ankle movement in 4 directions:  Sit on the floor with your legs straight in front of you  Keep your heels on the floor for support  ? Dorsiflexion:  Begin with your toes pointing straight up  Pull your toes toward your body  Slowly return to the starting position  Do 3 sets of 5      ? Plantar flexion:  Begin with your toes pointing straight up  Push your toes away from your body  Slowly return to the starting position  Do 3 sets of 5          ? Inversion:  Begin with your toes pointing straight up  Push your toes inward, toward each other  Slowly return to the starting position  Do 3 sets of 5      ? Eversion:  Begin with your toes pointing straight up  Push your toes outward, away from each other  Slowly return to the starting position  Do 3 sets of 5        · Toe curls with a towel:  Sit on a chair so that both of your feet are flat on the floor  Place a small towel on the floor in front of your injured foot  Grab the center of the towel with your toes and curl the towel toward you  Relax and repeat  Do 1 set of 5          · [place] pick-ups:  Sit on a chair so that both of your feet are flat on the floor  Place 20 marbles on the floor in front of your injured foot  Use your toes to  one marble at a time and place it into a bowl  Repeat until you have picked up all the marbles  Do 1 set  · Heel raises:      ? Single leg heel raises:  Stand with your weight evenly on both feet  Hold on to a chair or a wall for balance  Lift the foot that is not injured off the floor so all your weight is placed on your injured foot  Raise the heel of your injured foot as high as you can  Slowly lower your heel to the floor  Do 1 set of 10          ? Double leg heel raises:  Stand with your weight evenly on both feet  Hold on to a chair or a wall for balance  Raise both of your heels as high as you can  Slowly lower your heels to the floor  Do 1 set of 10  · Heel and toe walks:      ? Heel walks:  Begin in a standing position  Lift your toes off the floor and walk on your heels  Keep your toes lifted as high as possible  Do 2 sets of 10          ? Toe walks:  Begin in a standing position  Lift your heels off the floor and walk on the balls and toes of your feet  Keep your heels lifted as high as possible  Do 2 sets of 10  How to perform a balance exercise safely:  After you can perform strengthening exercises without pain, you may do this beginning balancing exercise  Ask your healthcare provider for more advanced balance exercises  · Single leg stance:  Stand with your weight evenly on both feet, or hold on to a chair or a wall  Do not lean to the side  Lift the foot that is not injured off the floor so all your weight is placed on your injured foot  Balance on your injured foot  Ask your healthcare provider how long to hold this position  Contact your healthcare provider if:   · Your pain becomes worse  · You have new pain  · You have questions or concerns about your condition, care, or exercise program     © Copyright [facility] [address] Information is for End User's use only and may not be sold, redistributed or otherwise used for commercial purposes   All illustrations and images included in CareNotes® are the copyrighted property of [employer]  or Lior Herring  The above information is an  only  It is not intended as medical advice for individual conditions or treatments  Talk to your doctor, nurse or pharmacist before following any medical regimen to see if it is safe and effective for you

## 2021-07-08 NOTE — PROGRESS NOTES
1  Traumatic closed nondisplaced fracture of right talus, with routine healing, subsequent encounter  XR ankle 3+ vw right    Ambulatory referral to Physical Therapy     Orders Placed This Encounter   Procedures    XR ankle 3+ vw right    Ambulatory referral to Physical Therapy        Imaging Studies (I personally reviewed images in PACS and report):  Xray right ankle 7/8/21:  Stable alignment with almost complete obscuration of fracture line      IMPRESSION:  nondisplaced posterior talus fracture   Date of Injury:  04/22/2021   Follow-up from injury:  11 weeks    Repeat X-ray next visit:   Non    Return if symptoms worsen or fail to improve, for follow-up with athletic trainers at [facility]   Patient Instructions     Start physical therapy with gradual return to running  Over the next 6 weeks   patient to follow-up with athletic trainers at [facility]  May follow-up with physician as needed    Ankle Exercises   AMBULATORY CARE:   What you need to know about ankle exercises: Ankle exercises help strengthen your ankle and improve its function after injury  These are beginning exercises  Ask your healthcare provider if you need to see a physical therapist for more advanced exercises  · Do these exercises 3 to 5 days a week , or as directed by your healthcare provider  Ask if you should perform the exercises on each ankle  · Do the exercises in the order that your healthcare provider recommends  This will help prevent swelling, chronic pain, and reinjury  Start with range of motion exercises  Then progress to strengthening exercises, and finally to balancing exercises  · Warm up before you do ankle exercises  Walk or ride a stationary bike for 5 to 10 minutes to prepare your ankle for movement  · Stop if you feel pain  It is normal to feel some discomfort at first  Regular exercise will help decrease your discomfort over time      How to perform range of motion exercises safely:  Begin with range of motion exercises to improve flexibility  Ask your healthcare provider when you can progress to strengthening exercises  · Ankle alphabet:  Sit on a chair so that your feet do not touch the floor  Use your big toe to write each letter of the alphabet  Use only your foot and ankle, and keep your movements small  Do 2 sets  · Calf stretches:      ? Sitting calf stretches with a towel:  Sit on the floor with both legs out straight in front of you  Loop a towel around the ball of your injured foot  Grasp the ends of the towel and pull it toward you  Keep your leg and back straight  Do not lean forward as you pull the towel  Hold for 30 seconds  Then relax for 30 seconds  Do 2 sets of 10          ? Standing calf stretches:  Stand facing a wall with the foot that is not injured forward and your knee slightly bent  Keep the leg with the injured foot straight and behind you with your toes pointed in slightly  With both heels flat on the floor, press your hips forward  Do not arch your back  Hold for 30 seconds, and then relax for 30 seconds  Do 2 sets of 10  Repeat with your leg bent  Do 2 sets of 10  How to perform strengthening exercises safely:  After you can perform range of motion exercises without pain, you may begin strengthening exercises  Ask your healthcare provider when you can progress to balancing exercises  · Ankle movement in 4 directions:  Sit on the floor with your legs straight in front of you  Keep your heels on the floor for support  ? Dorsiflexion:  Begin with your toes pointing straight up  Pull your toes toward your body  Slowly return to the starting position  Do 3 sets of 5      ? Plantar flexion:  Begin with your toes pointing straight up  Push your toes away from your body  Slowly return to the starting position  Do 3 sets of 5          ? Inversion:  Begin with your toes pointing straight up  Push your toes inward, toward each other   Slowly return to the starting position  Do 3 sets of 5      ? Eversion:  Begin with your toes pointing straight up  Push your toes outward, away from each other  Slowly return to the starting position  Do 3 sets of 5        · Toe curls with a towel:  Sit on a chair so that both of your feet are flat on the floor  Place a small towel on the floor in front of your injured foot  Grab the center of the towel with your toes and curl the towel toward you  Relax and repeat  Do 1 set of 5          · [place] pick-ups:  Sit on a chair so that both of your feet are flat on the floor  Place 20 marbles on the floor in front of your injured foot  Use your toes to  one marble at a time and place it into a bowl  Repeat until you have picked up all the marbles  Do 1 set  · Heel raises:      ? Single leg heel raises:  Stand with your weight evenly on both feet  Hold on to a chair or a wall for balance  Lift the foot that is not injured off the floor so all your weight is placed on your injured foot  Raise the heel of your injured foot as high as you can  Slowly lower your heel to the floor  Do 1 set of 10          ? Double leg heel raises:  Stand with your weight evenly on both feet  Hold on to a chair or a wall for balance  Raise both of your heels as high as you can  Slowly lower your heels to the floor  Do 1 set of 10  · Heel and toe walks:      ? Heel walks:  Begin in a standing position  Lift your toes off the floor and walk on your heels  Keep your toes lifted as high as possible  Do 2 sets of 10          ? Toe walks:  Begin in a standing position  Lift your heels off the floor and walk on the balls and toes of your feet  Keep your heels lifted as high as possible  Do 2 sets of 10  How to perform a balance exercise safely:  After you can perform strengthening exercises without pain, you may do this beginning balancing exercise  Ask your healthcare provider for more advanced balance exercises    · Single leg stance:  Stand with your weight evenly on both feet, or hold on to a chair or a wall  Do not lean to the side  Lift the foot that is not injured off the floor so all your weight is placed on your injured foot  Balance on your injured foot  Ask your healthcare provider how long to hold this position  Contact your healthcare provider if:   · Your pain becomes worse  · You have new pain  · You have questions or concerns about your condition, care, or exercise program     © Copyright [address] Information is for End User's use only and may not be sold, redistributed or otherwise used for commercial purposes  All illustrations and images included in CareNotes® are the copyrighted property of A D A M , Inc  or [employer]   The above information is an  only  It is not intended as medical advice for individual conditions or treatments  Talk to your doctor, nurse or pharmacist before following any medical regimen to see if it is safe and effective for you  CHIEF COMPLAINT:  Follow-up right ankle fracture    HPI:  Tha Armendariz is a 24 y o  male  who presents for       Visit 7/8/2021 :   stable  Interval healing on x-ray  Denies any pain  Review of Systems   Constitutional: Negative for chills and fever  Neurological: Negative for weakness           Following history reviewed and update:    Past Medical History:   Diagnosis Date    Known health problems: none      Past Surgical History:   Procedure Laterality Date    MYRINGOTOMY W/ TUBES      MYRINGOTOMY TUBES IN EARS AS PER [facility]    NO PAST SURGERIES       Social History   Social History     Substance and Sexual Activity   Alcohol Use No     Social History     Substance and Sexual Activity   Drug Use No     Social History     Tobacco Use   Smoking Status Never Smoker   Smokeless Tobacco Never Used     Family History   Problem Relation Age of Onset    Cancer Maternal Grandfather     No Known Problems Mother     No Known Problems Father      No Known Allergies       Physical Exam  /75 (BP Location: Right arm, Patient Position: Sitting, Cuff Size: Adult)   Pulse 97   Wt 79 8 kg (176 lb)   BMI 24 55 kg/m²     Constitutional:  see vital signs  Gen: well-developed, normocephalic/atraumatic, well-groomed  Eyes: No inflammation or discharge of conjunctiva or lids; sclera clear   Pharynx: no inflammation, lesion, or mass of lips  Neck: supple, no masses, non-distended  MSK: no inflammation, lesion, mass, or clubbing of nails and digits except for other than mentioned below  SKIN: no visible rashes or skin lesions  Pulmonary/Chest: Effort normal  No respiratory distress     NEURO: cranial nerves grossly intact  PSYCH:  Alert and oriented to person, place, and time; recent and remote memory intact; mood normal, no depression, anxiety, or agitation, judgment and insight good and intact     Ortho Exam    RIGHT ANKLE EXAM  Observation  GAIT:  normal    Inspection  Erythema: no  Ecchymosis: no  Edema:  none    Tenderness  Proximal Fibula: no  ([place] frx)  AiTFL: no  (2cm proximal-medial to tip lateral malleolus 92% sens, 29% spec)  ATFL: no  CFL: no  PTFL: no  Achilles:  no  Deltoid: No  Peroneal: no  Tibialis Anterior: no  Tibialis Posterior: no    Bony Tenderpoints:  Lateral Malleolus: no  Base of 5th MT: no  Medial Malleolus: no  Navicular: no  Talar dome: No    ROM  Dorsiflexion: intact  Plantarflexion: intact    Muscle Strength  Pronation: intact without pain  Supination: intact without pain    Tib-Fib Squeeze: negative  (lhihmybjyuww-bu-jbuipawttygffy squeeze; 26% sens, 88% spec; rule in test)    Calcaneal Squeeze: negative    Dorsiflexion (+) ER Stress Test: negative  (reproduce ATiFL mech; 71% sens, 63% spec)      Procedures

## 2022-03-28 ENCOUNTER — LAB REQUISITION (OUTPATIENT)
Dept: LAB | Facility: HOSPITAL | Age: 22
End: 2022-03-28
Payer: MEDICARE

## 2022-03-28 DIAGNOSIS — N39.0 URINARY TRACT INFECTION, SITE NOT SPECIFIED: ICD-10-CM

## 2022-03-28 LAB
BILIRUB UR QL STRIP: NEGATIVE
CLARITY UR: CLEAR
COLOR UR: NORMAL
GLUCOSE UR STRIP-MCNC: NEGATIVE MG/DL
HGB UR QL STRIP.AUTO: NEGATIVE
KETONES UR STRIP-MCNC: NEGATIVE MG/DL
LEUKOCYTE ESTERASE UR QL STRIP: NEGATIVE
NITRITE UR QL STRIP: NEGATIVE
PH UR STRIP.AUTO: 7 [PH]
PROT UR STRIP-MCNC: NEGATIVE MG/DL
SP GR UR STRIP.AUTO: 1.01 (ref 1–1.03)
UROBILINOGEN UR STRIP-ACNC: <2 MG/DL

## 2022-03-28 PROCEDURE — 87086 URINE CULTURE/COLONY COUNT: CPT | Performed by: NURSE PRACTITIONER

## 2022-03-28 PROCEDURE — 81003 URINALYSIS AUTO W/O SCOPE: CPT | Performed by: NURSE PRACTITIONER

## 2022-03-29 LAB — BACTERIA UR CULT: NORMAL

## 2022-04-27 NOTE — PATIENT INSTRUCTIONS
Note updated by provider.     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

## 2023-10-26 ENCOUNTER — NURSE TRIAGE (OUTPATIENT)
Age: 23
End: 2023-10-26

## 2023-10-26 NOTE — TELEPHONE ENCOUNTER
Client called in to make New patient appt. States he does not feel he has fully recovered from  STI, states he still has bruising at the tip of penis and has decrease in sexual drive, [facility] I asked if they had any urological issues such as erectile dysfunction, dysuria, testicular pain, etc. Client states the only issue is that he has decreased sexual and social drive, and bruising at tip of penis that has not cleared up since the STI. I deferred to STI clinic and gave client their phone number. Client verbalized understanding and will call us if STI clinic deems this urological and not related to STI.

## 2023-12-20 ENCOUNTER — OFFICE VISIT (OUTPATIENT)
Dept: UROLOGY | Facility: AMBULATORY SURGERY CENTER | Age: 23
End: 2023-12-20
Payer: COMMERCIAL

## 2023-12-20 VITALS
WEIGHT: 177 LBS | SYSTOLIC BLOOD PRESSURE: 120 MMHG | DIASTOLIC BLOOD PRESSURE: 76 MMHG | OXYGEN SATURATION: 98 % | HEIGHT: 72 IN | HEART RATE: 91 BPM | BODY MASS INDEX: 23.98 KG/M2

## 2023-12-20 DIAGNOSIS — N41.1 CHRONIC PROSTATITIS: Primary | ICD-10-CM

## 2023-12-20 PROCEDURE — 99204 OFFICE O/P NEW MOD 45 MIN: CPT | Performed by: UROLOGY

## 2023-12-20 RX ORDER — IBUPROFEN 600 MG/1
600 TABLET ORAL EVERY 8 HOURS PRN
Qty: 20 TABLET | Refills: 0 | Status: SHIPPED | OUTPATIENT
Start: 2023-12-20

## 2023-12-20 NOTE — PROGRESS NOTES
12/20/2023    Faustino Larsen  2000  [id number]      Assessment  Chronic prostatitis/epididymitis    Plan   We discussed natural history of prostatitis.  Symptoms are consistent with this diagnosis.  Explained that this is typical and men of his age, and may not be related to the gonorrheal infection.  First-line therapy is anti-inflammatory medication.  This will be self-limited however have him try medication.  We discussed dosing and risks associated with this.  He should take medication with lots of water and potentially with food as well if he develops any GI upset.  He asked for prescription was sent to his pharmacy no    This is over-the-counter medication.    He did follow-up with his PCP as it not anticipate any surgical intervention.  If symptoms persist and he is not comfortable, can proceed with further evaluation such as ultrasound, CT scan, cystoscopy.      History of Present Illness  Faustino is a 23 y.o. male with history of gonorrheal infection over a year ago.  He was successfully treated, however he reports chronic intermittent symptoms since then.  Reports bladder pressure as well as occasional pain in the scrotum on the right side.  He also has urethral discomfort and reports discomfort with ejaculation on occasions.  He has not treated this with anything, since his antibiotic injection for the gonorrheal infection.          Review of Systems  Review of Systems   Constitutional: Negative.    HENT: Negative.     Respiratory: Negative.     Cardiovascular: Negative.    Gastrointestinal: Negative.    Genitourinary:         As per HPI   Musculoskeletal: Negative.    Skin: Negative.    Neurological: Negative.    Hematological: Negative.          Past Medical History  Past Medical History:   Diagnosis Date    Known health problems: none     STD (sexually transmitted disease)        Past Social History  Past Surgical History:   Procedure Laterality Date    MYRINGOTOMY W/ TUBES      MYRINGOTOMY  TUBES IN EARS AS PER [facility]    NO PAST SURGERIES      WISDOM TOOTH EXTRACTION Bilateral        Past Family History  Family History   Problem Relation Age of Onset    No Known Problems Father     No Known Problems Mother     Cancer Maternal Grandfather        Past Social history  Social History     Socioeconomic History    Marital status: Single     Spouse name: Not on file    Number of children: Not on file    Years of education: Not on file    Highest education level: Not on file   Occupational History    Not on file   Tobacco Use    Smoking status: Never    Smokeless tobacco: Never   Vaping Use    Vaping status: Never Used   Substance and Sexual Activity    Alcohol use: Yes     Comment: rarely    Drug use: Yes     Types: Marijuana     Comment: Medical    Sexual activity: Yes     Partners: Female     Birth control/protection: Condom Male   Other Topics Concern    Not on file   Social History Narrative    Will be going to [employer] for [employer] Major     Works in restaurants cooking or dish washing        AS [facility] 3/27/13 [facility]:     PROVIDER: 0    DAYS/WEEK: 0     Social Determinants of Health     Financial Resource Strain: Not on file   Food Insecurity: Not on file   Transportation Needs: No Transportation Needs (2/19/2020)    PRAPARE - Transportation     Lack of Transportation (Medical): No     Lack of Transportation (Non-Medical): No   Physical Activity: Unknown (2/19/2020)    Exercise Vital Sign     Days of Exercise per Week: 3 days     Minutes of Exercise per Session: Not on file   Stress: Not on file   Social Connections: Not on file   Intimate Partner Violence: Not on file   Housing Stability: Not on file     Social History     Tobacco Use   Smoking Status Never   Smokeless Tobacco Never       Current Medications  Current Outpatient Medications   Medication Sig Dispense Refill    ibuprofen (MOTRIN) 600 mg tablet Take 1 tablet (600 mg total) by mouth every 8 (eight) hours as needed for mild pain 20  "tablet 0    acetaminophen (TYLENOL) 500 mg tablet Take 500 mg by mouth every 6 (six) hours as needed for mild pain (Patient not taking: Reported on 12/20/2023)      Misc. Devices (Rollator Ultra-Light) MISC Use daily (Patient not taking: Reported on 12/20/2023) 1 each 0    naproxen (NAPROSYN) 500 mg tablet Take 1 tablet (500 mg total) by mouth 2 (two) times a day as needed for moderate pain (Patient not taking: Reported on 12/20/2023) 40 tablet 0     No current facility-administered medications for this visit.       Allergies  No Known Allergies    Past Medical History, Social History, Family History, medications and allergies were reviewed.    Vitals  Vitals:    12/20/23 0954   BP: 120/76   BP Location: Left arm   Patient Position: Sitting   Cuff Size: Standard   Pulse: 91   SpO2: 98%   Weight: 80.3 kg (177 lb)   Height: 6' (1.829 m)       Physical Exam  Physical Exam  Vitals reviewed.   Constitutional:       Appearance: He is well-developed.   HENT:      Head: Normocephalic and atraumatic.   Eyes:      Conjunctiva/sclera: Conjunctivae normal.   Cardiovascular:      Rate and Rhythm: Normal rate.   Pulmonary:      Effort: Pulmonary effort is normal.   Abdominal:      Palpations: Abdomen is soft.   Genitourinary:     Penis: Normal.       Testes: Normal.   Musculoskeletal:         General: Normal range of motion.   Skin:     General: Skin is warm and dry.   Neurological:      Mental Status: He is alert and oriented to person, place, and time.   Psychiatric:         Mood and Affect: Mood normal.           Results  No results found for: \"PSA\"  No results found for: \"GLUCOSE\", \"CALCIUM\", \"NA\", \"K\", \"CO2\", \"CL\", \"BUN\", \"CREATININE\"  No results found for: \"WBC\", \"HGB\", \"HCT\", \"MCV\", \"PLT\"        "

## 2024-02-21 PROBLEM — J32.9 RECURRENT SINUSITIS: Status: RESOLVED | Noted: 2018-02-28 | Resolved: 2024-02-21

## 2025-04-15 ENCOUNTER — OCCMED (OUTPATIENT)
Dept: URGENT CARE | Facility: CLINIC | Age: 25
End: 2025-04-15

## 2025-04-15 DIAGNOSIS — Z02.1 ENCOUNTER FOR PRE-EMPLOYMENT HEALTH SCREENING EXAMINATION: Primary | ICD-10-CM

## 2025-04-15 DIAGNOSIS — Z02.89 ENCOUNTER FOR PHYSICAL EXAMINATION RELATED TO EMPLOYMENT: ICD-10-CM

## 2025-04-15 LAB — RUBV IGG SERPL IA-ACNC: 54 IU/ML

## 2025-04-15 PROCEDURE — 36415 COLL VENOUS BLD VENIPUNCTURE: CPT

## 2025-04-15 PROCEDURE — 86762 RUBELLA ANTIBODY: CPT

## 2025-04-15 PROCEDURE — 86480 TB TEST CELL IMMUN MEASURE: CPT

## 2025-04-15 PROCEDURE — 86787 VARICELLA-ZOSTER ANTIBODY: CPT

## 2025-04-15 PROCEDURE — 86765 RUBEOLA ANTIBODY: CPT

## 2025-04-15 PROCEDURE — 86735 MUMPS ANTIBODY: CPT

## 2025-04-16 LAB
GAMMA INTERFERON BACKGROUND BLD IA-ACNC: 0.03 IU/ML
M TB IFN-G BLD-IMP: NEGATIVE
M TB IFN-G CD4+ BCKGRND COR BLD-ACNC: 0 IU/ML
M TB IFN-G CD4+ BCKGRND COR BLD-ACNC: 0.01 IU/ML
MEV IGG SER QL IA: 18.1 AU/ML
MEV IGG SER QL IA: POSITIVE
MITOGEN IGNF BCKGRD COR BLD-ACNC: 9.97 IU/ML
MUV IGG SER QL IA: 39.5 AU/ML
MUV IGG SER QL IA: POSITIVE
VZV IGG SER QL IA: 0.43 S/CO
VZV IGG SER QL IA: NEGATIVE